# Patient Record
Sex: FEMALE | Race: ASIAN | NOT HISPANIC OR LATINO | URBAN - METROPOLITAN AREA
[De-identification: names, ages, dates, MRNs, and addresses within clinical notes are randomized per-mention and may not be internally consistent; named-entity substitution may affect disease eponyms.]

---

## 2023-09-16 ENCOUNTER — EMERGENCY (EMERGENCY)
Facility: HOSPITAL | Age: 78
LOS: 1 days | Discharge: HOME | End: 2023-09-16
Attending: STUDENT IN AN ORGANIZED HEALTH CARE EDUCATION/TRAINING PROGRAM | Admitting: STUDENT IN AN ORGANIZED HEALTH CARE EDUCATION/TRAINING PROGRAM
Payer: MEDICARE

## 2023-09-16 VITALS
DIASTOLIC BLOOD PRESSURE: 73 MMHG | OXYGEN SATURATION: 100 % | HEART RATE: 70 BPM | RESPIRATION RATE: 16 BRPM | TEMPERATURE: 98 F | SYSTOLIC BLOOD PRESSURE: 150 MMHG

## 2023-09-16 PROCEDURE — 99283 EMERGENCY DEPT VISIT LOW MDM: CPT | Mod: 25

## 2023-09-16 NOTE — ED ADULT TRIAGE NOTE - CHIEF COMPLAINT QUOTE
pt was sitting down and left top of the foot started bleeding, denies any scratching or injury/fall. bleeding controlled with foot wrapped in gauze at this time. takes aspirin. has no complaints, no pain. hx. HTN, thyroid CA

## 2023-09-17 NOTE — ED PROVIDER NOTE - ATTENDING CONTRIBUTION TO CARE
78 yo F hx HTN, DM2, brought in by son for bleeding to L foot. Pt was sitting on cough, when foot began bleeding. Denies trauma. Bleeding did not stop with pressure and elevation, but EMS wrapped area. Upon evaluation in ED, bleeding stopped. Area noted to be overlying a vessel. No active bleed. Pulses intact b/l LE, extremities warm and well perfused. VSS. Denies cp, sob, palpitations, dizziness. Will pressure wrap area and dc with return recs

## 2023-09-17 NOTE — ED PROVIDER NOTE - PHYSICAL EXAMINATION
VITALS:   T(C): 36.6 (09-16-23 @ 23:52), Max: 36.6 (09-16-23 @ 23:52)  HR: 70 (09-16-23 @ 23:52) (70 - 70)  BP: 150/73 (09-16-23 @ 23:52) (150/73 - 150/73)  RR: 16 (09-16-23 @ 23:52) (16 - 16)  SpO2: 100% (09-16-23 @ 23:52) (100% - 100%)    GENERAL: NAD, lying in bed comfortably  HEAD:  Atraumatic, normocephalic  EYES: EOMI, PERRLA, conjunctiva and sclera clear  ENT: Moist mucous membranes  NECK: Supple, no JVD  HEART: Regular rate and rhythm, no murmurs, rubs, or gallops  LUNGS: Unlabored respirations.  Clear to auscultation bilaterally, no crackles, wheezing, or rhonchi  ABDOMEN: Soft, nontender, nondistended, +BS  EXTREMITIES: 2+ peripheral pulses bilaterally. L foot with small raised bump with small wound. Dried blood all over foot, no active bleeding.   NERVOUS SYSTEM:  A&Ox3, no focal deficits   SKIN: No rashes or lesions

## 2023-09-17 NOTE — ED PROVIDER NOTE - OBJECTIVE STATEMENT
Pt is a 77 year old Mandarin-speaking woman with DM, htn, and s/p thyroid ca treatment who presents with bleeding of L foot. She was sitting on couch when suddenly top of foot started spurting bright red blood. 5 mins of pressure and elevation did not stop the bleeding so son called 911. EMS wrapped in gauze. Pt not feeling any other symptoms. Foot is not painful. She takes asa81 but no other antiplatelets/AC.

## 2023-09-17 NOTE — ED PROVIDER NOTE - PATIENT PORTAL LINK FT
You can access the FollowMyHealth Patient Portal offered by [facility] by registering at the following website: [email or web address]. By joining [employer]’s FollowMyHealth portal, you will also be able to view your health information using other applications (apps) compatible with our system.

## 2023-09-17 NOTE — ED ADULT NURSE NOTE - OBJECTIVE STATEMENT
patient is A&OX4, ambulatory at baseline. h/o HTN, DM2, thyroid cancer, daily ASA use. Pt is coming to ED from home regarding left foot bleeding, Patient states she was sitting down when her foot started to bleed profusely, family was able to stop bleeding, bleeding is under control at this time. Pt denies any injury or recent falls. patient has full ROM to all extremities, pedal pulse equal BL, skin color appropriate. awaiting orders, will continue to monitor.

## 2023-09-17 NOTE — ED PROVIDER NOTE - NS ED ROS FT

## 2023-09-17 NOTE — ED ADULT NURSE NOTE - CAS DISCH CONDITION
Rehab Progress Note     Date of Service: 2019  Chief Complaint: follow up stroke    Interval Events (Subjective)    She is seen and examined during her occupational therapy session today.  She continues to report double vision though this is mostly at distance.  She reports her dizziness and nausea is much improved.  She has multiple family members present.  Cognitive assessment by speech therapist was negative for any cognitive deficits from the stroke so they will sign off.    Objective:  VITAL SIGNS: /54   Pulse 78   Temp 36.8 °C (98.3 °F) (Oral)   Resp 18   Ht 1.524 m (5')   Wt 68 kg (149 lb 14.6 oz)   SpO2 95%   BMI 29.28 kg/m²   Gen: alert, no apparent distress  CV: regular rate and rhythm, no murmurs, no peripheral edema  Resp: clear to ascultation bilaterally, normal respiratory effort  GI: soft, non-tender abdomen, bowel sounds present  Neuro: notable for double vision, horizontal nystagmus    Recent Results (from the past 72 hour(s))   ACCU-CHEK GLUCOSE    Collection Time: 19  5:33 PM   Result Value Ref Range    Glucose - Accu-Ck 245 (H) 65 - 99 mg/dL   EKG    Collection Time: 19 11:44 PM   Result Value Ref Range    Report       [facility]    Test Date:  2019  Pt Name:    CAITLYN KIRAN                Department: 183  MRN:        [id number]                      Room:       T811  Gender:     Female                       Technician: EDUING  :        [id number]                   Requested By:KAMILAH SHAW  Order #:    [id number]                    Reading MD: Leroy Mayer MD    Measurements  Intervals                                Axis  Rate:       79                           P:          44  LA:         148                          QRS:        79  QRSD:       87                           T:          27  QT:         389  QTc:        447    Interpretive Statements  SINUS RHYTHM  LOW VOLTAGE, EXTREMITY LEADS  BORDERLINE DIFFUSE ST ELEVATION, CONSIDER PERICARDITIS  EARLY  PRECORDIAL R/S TRANSITION  Compared to ECG 08/19/2019 13:13:04  ST CHANGES ARE SLIGHTLY MORE PREDOMINANT    Electronically Signed On 8- 16:05:28 PDT by Leroy Mayer MD     ACCU-CHEK GLUCOSE    Collection Time: 08/20/19 12:20 AM   Result Value Ref Range    Glucose - Accu-Ck 241 (H) 65 - 99 mg/dL   Lipid Profile    Collection Time: 08/20/19 12:24 AM   Result Value Ref Range    Cholesterol,Tot 162 100 - 199 mg/dL    Triglycerides 80 0 - 149 mg/dL    HDL 46 >=40 mg/dL     (H) <100 mg/dL   CBC with Differential    Collection Time: 08/20/19 12:24 AM   Result Value Ref Range    WBC 9.2 4.8 - 10.8 K/uL    RBC 4.05 (L) 4.20 - 5.40 M/uL    Hemoglobin 12.3 12.0 - 16.0 g/dL    Hematocrit 39.2 37.0 - 47.0 %    MCV 96.8 81.4 - 97.8 fL    MCH 30.4 27.0 - 33.0 pg    MCHC 31.4 (L) 33.6 - 35.0 g/dL    RDW 54.4 (H) 35.9 - 50.0 fL    Platelet Count 148 (L) 164 - 446 K/uL    MPV 10.7 9.0 - 12.9 fL    Neutrophils-Polys 46.80 44.00 - 72.00 %    Lymphocytes 43.10 (H) 22.00 - 41.00 %    Monocytes 7.30 0.00 - 13.40 %    Eosinophils 2.10 0.00 - 6.90 %    Basophils 0.50 0.00 - 1.80 %    Immature Granulocytes 0.20 0.00 - 0.90 %    Nucleated RBC 0.00 /100 WBC    Neutrophils (Absolute) 4.29 2.00 - 7.15 K/uL    Lymphs (Absolute) 3.96 1.00 - 4.80 K/uL    Monos (Absolute) 0.67 0.00 - 0.85 K/uL    Eos (Absolute) 0.19 0.00 - 0.51 K/uL    Baso (Absolute) 0.05 0.00 - 0.12 K/uL    Immature Granulocytes (abs) 0.02 0.00 - 0.11 K/uL    NRBC (Absolute) 0.00 K/uL   Basic Metabolic Panel (BMP)    Collection Time: 08/20/19 12:24 AM   Result Value Ref Range    Sodium 137 135 - 145 mmol/L    Potassium 4.2 3.6 - 5.5 mmol/L    Chloride 108 96 - 112 mmol/L    Co2 22 20 - 33 mmol/L    Glucose 269 (H) 65 - 99 mg/dL    Bun 22 8 - 22 mg/dL    Creatinine 0.78 0.50 - 1.40 mg/dL    Calcium 8.4 (L) 8.5 - 10.5 mg/dL    Anion Gap 7.0 0.0 - 11.9   ESTIMATED GFR    Collection Time: 08/20/19 12:24 AM   Result Value Ref Range    GFR If  >60 >60  mL/min/1.73 m 2    GFR If Non African American >60 >60 mL/min/1.73 m 2   ACCU-CHEK GLUCOSE    Collection Time: 08/20/19  5:10 AM   Result Value Ref Range    Glucose - Accu-Ck 161 (H) 65 - 99 mg/dL   EC-ECHOCARDIOGRAM COMPLETE W/O CONT    Collection Time: 08/20/19 10:56 AM   Result Value Ref Range    Eject.Frac. MOD BP 67.56     Eject.Frac. MOD 4C 70.05     Eject.Frac. MOD 2C 60.51     Left Ventrical Ejection Fraction 70    ACCU-CHEK GLUCOSE    Collection Time: 08/20/19 11:32 AM   Result Value Ref Range    Glucose - Accu-Ck 247 (H) 65 - 99 mg/dL   ACCU-CHEK GLUCOSE    Collection Time: 08/20/19  5:50 PM   Result Value Ref Range    Glucose - Accu-Ck 230 (H) 65 - 99 mg/dL   ACCU-CHEK GLUCOSE    Collection Time: 08/20/19  9:11 PM   Result Value Ref Range    Glucose - Accu-Ck 306 (H) 65 - 99 mg/dL   ACCU-CHEK GLUCOSE    Collection Time: 08/21/19  8:01 AM   Result Value Ref Range    Glucose - Accu-Ck 152 (H) 65 - 99 mg/dL   ACCU-CHEK GLUCOSE    Collection Time: 08/21/19 11:39 AM   Result Value Ref Range    Glucose - Accu-Ck 295 (H) 65 - 99 mg/dL   ACCU-CHEK GLUCOSE    Collection Time: 08/21/19  5:05 PM   Result Value Ref Range    Glucose - Accu-Ck 156 (H) 65 - 99 mg/dL   ACCU-CHEK GLUCOSE    Collection Time: 08/21/19  9:54 PM   Result Value Ref Range    Glucose - Accu-Ck 198 (H) 65 - 99 mg/dL   CBC with Differential    Collection Time: 08/22/19  5:53 AM   Result Value Ref Range    WBC 7.7 4.8 - 10.8 K/uL    RBC 4.37 4.20 - 5.40 M/uL    Hemoglobin 13.7 12.0 - 16.0 g/dL    Hematocrit 41.6 37.0 - 47.0 %    MCV 95.2 81.4 - 97.8 fL    MCH 31.4 27.0 - 33.0 pg    MCHC 32.9 (L) 33.6 - 35.0 g/dL    RDW 52.9 (H) 35.9 - 50.0 fL    Platelet Count 155 (L) 164 - 446 K/uL    MPV 10.7 9.0 - 12.9 fL    Neutrophils-Polys 45.00 44.00 - 72.00 %    Lymphocytes 43.20 (H) 22.00 - 41.00 %    Monocytes 7.10 0.00 - 13.40 %    Eosinophils 4.00 0.00 - 6.90 %    Basophils 0.40 0.00 - 1.80 %    Immature Granulocytes 0.30 0.00 - 0.90 %    Nucleated RBC  0.00 /100 WBC    Neutrophils (Absolute) 3.48 2.00 - 7.15 K/uL    Lymphs (Absolute) 3.34 1.00 - 4.80 K/uL    Monos (Absolute) 0.55 0.00 - 0.85 K/uL    Eos (Absolute) 0.31 0.00 - 0.51 K/uL    Baso (Absolute) 0.03 0.00 - 0.12 K/uL    Immature Granulocytes (abs) 0.02 0.00 - 0.11 K/uL    NRBC (Absolute) 0.00 K/uL   Comp Metabolic Panel (CMP)    Collection Time: 08/22/19  5:53 AM   Result Value Ref Range    Sodium 139 135 - 145 mmol/L    Potassium 3.9 3.6 - 5.5 mmol/L    Chloride 108 96 - 112 mmol/L    Co2 22 20 - 33 mmol/L    Anion Gap 9.0 0.0 - 11.9    Glucose 174 (H) 65 - 99 mg/dL    Bun 20 8 - 22 mg/dL    Creatinine 0.63 0.50 - 1.40 mg/dL    Calcium 8.8 8.5 - 10.5 mg/dL    AST(SGOT) 18 12 - 45 U/L    ALT(SGPT) 20 2 - 50 U/L    Alkaline Phosphatase 72 30 - 99 U/L    Total Bilirubin 0.6 0.1 - 1.5 mg/dL    Albumin 3.4 3.2 - 4.9 g/dL    Total Protein 6.2 6.0 - 8.2 g/dL    Globulin 2.8 1.9 - 3.5 g/dL    A-G Ratio 1.2 g/dL   Magnesium    Collection Time: 08/22/19  5:53 AM   Result Value Ref Range    Magnesium 1.6 1.5 - 2.5 mg/dL   Vitamin D, 25-hydroxy (blood)    Collection Time: 08/22/19  5:53 AM   Result Value Ref Range    25-Hydroxy   Vitamin D 25 20 (L) 30 - 100 ng/mL   ESTIMATED GFR    Collection Time: 08/22/19  5:53 AM   Result Value Ref Range    GFR If African American >60 >60 mL/min/1.73 m 2    GFR If Non African American >60 >60 mL/min/1.73 m 2   ACCU-CHEK GLUCOSE    Collection Time: 08/22/19  8:15 AM   Result Value Ref Range    Glucose - Accu-Ck 197 (H) 65 - 99 mg/dL   ACCU-CHEK GLUCOSE    Collection Time: 08/22/19 10:55 AM   Result Value Ref Range    Glucose - Accu-Ck 380 (H) 65 - 99 mg/dL       Current Facility-Administered Medications   Medication Frequency   • vitamin D (cholecalciferol) tablet 2,000 Units DAILY   • Respiratory Care per Protocol Continuous RT   • Pharmacy Consult Request ...Pain Management Review 1 Each PHARMACY TO DOSE   • acetaminophen (TYLENOL) tablet 650 mg Q4HRS PRN   • hydrALAZINE  (APRESOLINE) tablet 10 mg Q8HRS PRN   • artificial tears ophthalmic solution 1 Drop PRN   • benzocaine-menthol (CEPACOL) lozenge 1 Lozenge Q2HRS PRN   • mag hydrox-al hydrox-simeth (MAALOX PLUS ES or MYLANTA DS) suspension 20 mL Q2HRS PRN   • ondansetron (ZOFRAN ODT) dispertab 4 mg 4X/DAY PRN    Or   • ondansetron (ZOFRAN) syringe/vial injection 4 mg 4X/DAY PRN   • traZODone (DESYREL) tablet 50 mg QHS PRN   • sodium chloride (OCEAN) 0.65 % nasal spray 2 Spray PRN   • hydrOXYzine HCl (ATARAX) tablet 50 mg Q6HRS PRN   • melatonin tablet 3 mg HS PRN   • enoxaparin (LOVENOX) inj 40 mg DAILY   • senna-docusate (PERICOLACE or SENOKOT S) 8.6-50 MG per tablet 2 Tab BID    And   • polyethylene glycol/lytes (MIRALAX) PACKET 1 Packet QDAY PRN    And   • magnesium hydroxide (MILK OF MAGNESIA) suspension 30 mL QDAY PRN    And   • bisacodyl (DULCOLAX) suppository 10 mg QDAY PRN   • insulin regular (HUMULIN R) injection 2-9 Units 4X/DAY ACHS    And   • glucose 4 g chewable tablet 16 g Q15 MIN PRN    And   • dextrose 50% (D50W) injection 50 mL Q15 MIN PRN   • atorvastatin (LIPITOR) tablet 80 mg Q EVENING   • aspirin EC (ECOTRIN) tablet 81 mg DAILY   • SITagliptin (JANUVIA) tablet 25 mg DAILY   • scopolamine (TRANSDERM-SCOP) patch 1 Patch Q72HRS   • enalapril (VASOTEC) tablet 5 mg Q DAY       Orders Placed This Encounter   Procedures   • Diet Order Diabetic     Standing Status:   Standing     Number of Occurrences:   1     Order Specific Question:   Diet:     Answer:   Diabetic [3]     Order Specific Question:   Consistency/Fluid modifications:     Answer:   Thin Liquids [3]       Assessment:  Active Hospital Problems    Diagnosis   • *Stroke, lacunar (HCC)   • Elevated troponin   • Type 2 diabetes mellitus with hyperglycemia, without long-term current use of insulin (HCC)   • Essential hypertension     This patient is a 72 y.o. female admitted for acute inpatient rehabilitation with Stroke, lacunar (HCC).    Medical Decision Making  and Plan:    Right paramedial midbrain/thalmic stroke  Non-dominant  Double vision  Nystagmus  Continue full rehab program  PT/OT, 1.5 hr each discipline, 5 days per week  SLP eval without cognitive deficits  Continue aspirin and statin for secondary stroke prophylaxis  Eye patch     Dizziness  Continue scopolamine patch  PRN Zofran     Hypertension  Continue Enalapril  Appreciate hospitalist assistance     Diabetes  Continue Januvia   Continue SSI  Appreciate hospitalist assistance    Vit D insufficiency  Start supplementation    DVT prophylaxis  Lovenox    Total time:  35 minutes.  I spent greater than 50% of the time for patient care, counseling, and coordination on this date, including patient face-to face time, unit/floor time with review of records/pertinent lab data and studies, as well as discussing diagnostic evaluation/work up, planned therapeutic interventions, and future disposition of care, as per the interval events/subjective and the assessment and plan as noted above.    I have performed a physical exam, reviewed and updated ROS, as well as the assessment and plan today 8/22/2019. In review of note from 8/21/2019 there are no new changes except as documented above.          Tereza Henderson M.D.   Physical Medicine and Rehabilitation         Stable

## 2023-09-17 NOTE — ED PROVIDER NOTE - CLINICAL SUMMARY MEDICAL DECISION MAKING FREE TEXT BOX
76yo F with DM and htn p/w bleeding L foot wound. Likely from excoriation of skin tag/age spot over artery/arteriole. No active bleed upon exam in ED. Foot cleaned and wrapped in clean gauze/ace bandage. Pt feeling well. Ready for DC home.